# Patient Record
Sex: FEMALE | Race: OTHER
[De-identification: names, ages, dates, MRNs, and addresses within clinical notes are randomized per-mention and may not be internally consistent; named-entity substitution may affect disease eponyms.]

---

## 2019-01-08 ENCOUNTER — HOSPITAL ENCOUNTER (OUTPATIENT)
Dept: HOSPITAL 72 - SUR | Age: 69
Discharge: HOME | End: 2019-01-08
Payer: MEDICARE

## 2019-01-08 VITALS — DIASTOLIC BLOOD PRESSURE: 66 MMHG | SYSTOLIC BLOOD PRESSURE: 130 MMHG

## 2019-01-08 VITALS — SYSTOLIC BLOOD PRESSURE: 141 MMHG | DIASTOLIC BLOOD PRESSURE: 69 MMHG

## 2019-01-08 VITALS — DIASTOLIC BLOOD PRESSURE: 65 MMHG | SYSTOLIC BLOOD PRESSURE: 139 MMHG

## 2019-01-08 VITALS — WEIGHT: 180 LBS | HEIGHT: 66 IN | BODY MASS INDEX: 28.93 KG/M2

## 2019-01-08 VITALS — SYSTOLIC BLOOD PRESSURE: 133 MMHG | DIASTOLIC BLOOD PRESSURE: 72 MMHG

## 2019-01-08 VITALS — DIASTOLIC BLOOD PRESSURE: 76 MMHG | SYSTOLIC BLOOD PRESSURE: 145 MMHG

## 2019-01-08 VITALS — SYSTOLIC BLOOD PRESSURE: 138 MMHG | DIASTOLIC BLOOD PRESSURE: 68 MMHG

## 2019-01-08 VITALS — SYSTOLIC BLOOD PRESSURE: 140 MMHG | DIASTOLIC BLOOD PRESSURE: 69 MMHG

## 2019-01-08 VITALS — SYSTOLIC BLOOD PRESSURE: 129 MMHG | DIASTOLIC BLOOD PRESSURE: 71 MMHG

## 2019-01-08 VITALS — DIASTOLIC BLOOD PRESSURE: 72 MMHG | SYSTOLIC BLOOD PRESSURE: 141 MMHG

## 2019-01-08 VITALS — DIASTOLIC BLOOD PRESSURE: 72 MMHG | SYSTOLIC BLOOD PRESSURE: 139 MMHG

## 2019-01-08 DIAGNOSIS — Z88.0: ICD-10-CM

## 2019-01-08 DIAGNOSIS — Z88.8: ICD-10-CM

## 2019-01-08 DIAGNOSIS — D64.9: ICD-10-CM

## 2019-01-08 DIAGNOSIS — E78.5: ICD-10-CM

## 2019-01-08 DIAGNOSIS — Z88.2: ICD-10-CM

## 2019-01-08 DIAGNOSIS — E11.9: ICD-10-CM

## 2019-01-08 DIAGNOSIS — E66.3: ICD-10-CM

## 2019-01-08 DIAGNOSIS — Z79.4: ICD-10-CM

## 2019-01-08 DIAGNOSIS — I10: ICD-10-CM

## 2019-01-08 DIAGNOSIS — M23.222: Primary | ICD-10-CM

## 2019-01-08 DIAGNOSIS — M60.9: ICD-10-CM

## 2019-01-08 DIAGNOSIS — M23.201: ICD-10-CM

## 2019-01-08 DIAGNOSIS — K21.9: ICD-10-CM

## 2019-01-08 PROCEDURE — 82962 GLUCOSE BLOOD TEST: CPT

## 2019-01-08 PROCEDURE — 94003 VENT MGMT INPAT SUBQ DAY: CPT

## 2019-01-08 PROCEDURE — 29880 ARTHRS KNE SRG MNISECTMY M&L: CPT

## 2019-01-08 PROCEDURE — 94150 VITAL CAPACITY TEST: CPT

## 2019-01-08 NOTE — BRIEF OPERATIVE NOTE
Immediate Post Operative Note


Operative Note


Pre-op Diagnosis:


Left Knee meniscus tear both medial and lateral


Procedure:





medial and lateral meniscectomy


Chondroplasty


synovectomy


arthroscopy


Post-op Diagnosis:


Torn left knee medial and lateral meniscus


synovits


degenerative arthritis knee


Surgeon:  Harshal Rothman


Anesthesiologist:  Dr. Stone


Anesthesia:  general


Specimen:  yes


Complications:  none


Condition:  stable


Fluids:  250 cc


Estimated Blood Loss:  minimal


Drains:  none


Implant(s) used?:  No











Harshal Rothman MD Jan 8, 2019 08:36

## 2019-01-08 NOTE — IMMEDIATE POST-OP EVALUATION
Immediate Post-Op Evalulation


Immediate Post-Op Evalulation


Procedure:  L knee arthroscopy, meniscectomy


Date of Evaluation:  Jan 8, 2019


Time of Evaluation:  08:40


IV Fluids:  700


Blood Products:  none


Estimated Blood Loss:  min


Urinary Output:  none


Blood Pressure Systolic:  132


Blood Pressure Diastolic:  68


Pulse Rate:  76


Respiratory Rate:  22


O2 Sat by Pulse Oximetry:  99


Temperature (Fahrenheit):  97.6


Pain Score (1-10):  1


Nausea:  No


Vomiting:  No


Complications


none


Patient Status:  awake, patent, none


Hydration Status:  adequate











Tyshawn Garcia MD Jan 8, 2019 08:41

## 2019-01-08 NOTE — ANETHESIA PREOPERATIVE EVAL
Anesthesia Pre-op PMH/ROS


General


Date of Evaluation:  Jan 8, 2019


Time of Evaluation:  07:05


Anesthesiologist:  Radha


ASA Score:  ASA 3


Mallampati Score


Class I : Soft palate, uvula, fauces, pillars visible


Class II: Soft palate, uvula, fauces visible


Class III: Soft palate, base of uvula visible


Class IV: Only hard plate visible


Mallampati Classification:  Class II


Surgeon:  Stephan


Diagnosis:  L knee pain


Surgical Procedure:  L knee artroscopy


Anesthesia History:  none


Family History:  no anesthesia problems


Allergies:  


Coded Allergies:  


     PENICILLINS (Verified  Allergy, Severe, 1/7/19)


 swelling,itching


     STATINS-HMG-COA REDUCTASE INHIBITOR (Verified  Allergy, Severe, 1/7/19)


 MYOSITIS-LOST OF MUSCLE ACTIVITY


     SULFA (SULFONAMIDE ANTIBIOTICS) (Verified  Allergy, Severe, 1/7/19)


 FACIAL AND NECK WELLING


Medications:  see eMAR


Patient NPO?:  Yes





Past Medical History


Cardiovascular:  Reports: HTN - stable; 


   Denies: CAD, MI, valve dz, arrhythmia, other


Pulmonary:  Denies: asthma, COPD, ROXANNE, other


Gastrointestinal/Genitourinary:  Reports: GERD - mild; 


   Denies: CRI, ESRD, other


Neurologic/Psychiatric:  Denies: dementia, CVA, depression/anxiety, TIA, other


Endocrine:  Reports: DM - stable on insulin; 


   Denies: hypothyroidism, steroids, other


HEENT:  Denies: cataract (L), cataract (R), glaucoma, Platinum (L), Platinum (R), other


Hematology/Immune:  Reports: anemia - mild; 


   Denies: DVT, bleeding disorder, other


Musculoskeletal/Integumentary:  Reports: OA; 


   Denies: RA, DJD, DDD, edema, other


Other:  other - overweight


PMH Narrative:


as above


PSxH Narrative:


Ovarian cyst removal





Anesthesia Pre-op Phys. Exam


Physician Exam





Last Vital Signs








  Date Time  Temp Pulse Resp B/P (MAP) Pulse Ox O2 Delivery O2 Flow Rate FiO2


 


1/8/19 06:28      Room Air  


 


1/8/19 06:18 97.1 88 18 145/76 100   








Constitutional:  NAD


Neurologic:  CN 2-12 intact


Cardiovascular:  RRR, no M/R/G


Respiratory:  CTA


Gastrointestinal:  S/NT/ND





Airway Exam


Mallampati Score:  Class II


MO:  full


Neck:  stiff


ROM:  limited


Teeth:  intact


Dentures:  no upper, no lower





Anesthesia Pre-op A/P


Labs


see chart


Accucheck


124 at admission





Studies


Pre-op Studies:  EKG - NSR





Risk Assessment & Plan


Assessment:


ASA3


Plan:


GA with LMA


Status Change Before Surgery:  No





Pre-Antibiotics


Drug:  Clindamycin 600mg


Given Within 1 Hr of Incision:  Yes


Time Given:  07:42











Tyshawn Garcia MD Jan 8, 2019 07:52

## 2019-01-08 NOTE — OPERATIVE NOTE - DICTATED
DATE OF OPERATION:  01/08/2019

PREOPERATIVE DIAGNOSES:  Medial meniscus tear, lateral meniscus tear,

degenerative arthritis of left knee.



POSTOPERATIVE DIAGNOSES:  Medial meniscus tear, lateral meniscus tear,

degenerative arthritis, synovitis of left knee.



OPERATIVE PROCEDURE:  Medial meniscectomy, lateral meniscectomy,

synovectomy, arthroscopy, chondroplasty.



SURGEON:  Harshal Rothman M.D.



OPERATIVE FINDINGS:  The patient had a posterior horn of medial meniscus

tear.  The patient had severe lateral meniscus tear.  There was grade 1 to

2 chondromalacia of the medial femoral condyle, grade 3 chondromalacia of

the lateral femoral condyle, grade 4 loss of cartilage to the lateral

tibial plateau.  There was synovitis throughout the knee.



DESCRIPTION OF OPERATIVE PROCEDURE:  The patient was taken to the operating

room under general anesthesia.  Left knee diagnostic arthroscopy was done.

Medial and lateral portals were used with drainage portal through the

superomedial site.  Diagnostic arthroscopy was done.  A subtotal posterior

third medial meniscus tear was carried out removing all damaged tissue.

The cartilage was stable underneath the meniscus tear and no chondroplasty

was done.



There was extensive synovitis throughout the anteromedial and

anterolateral aspect of the knee as well as superomedially and

superolaterally.  Anteromedial, anterolateral, superomedial,

superolateral, and suprapatellar synovectomies were done care being taken

to get hemostasis.



From the lateral side of the knee, extensive synovectomy was carried

out.  The lateral meniscus was shorter and torn severely.  The lateral

meniscus was removed to a stable base.  There was granulation tissue and

bleeding of the lateral meniscus indicating inflammation.  Hemostasis was

obtained using electrocautery.  All debris was removed from the lateral

side of the knee and all torn lateral meniscus tissue was removed.



There was chondromalacia grade 4 and all loose cartilage was removed

from the lateral side of the knee removing frayed cartilage.  Because of

the patient's age, no entry into the bone was done.



The wound was irrigated copiously.  Additional synovectomy was carried

out through the medial gutter and the lateral gutter.  There was loose

debris in the medial gutter that was removed with a shaver.  The

suprapatellar pouch was examined and all debris was removed and all

synovial hypertrophy was removed.  Hemostasis was obtained.



The patellofemoral joint had grade 2 chondromalacia and no procedure was

done to the patella.



The patient tolerated the procedure well and was taken to the recovery

room in good condition.









  ______________________________________________

  Harshal Rothman M.D.





DR:  Esvin

D:  01/08/2019 08:58

T:  01/08/2019 09:32

JOB#:  [id number]

CC:

## 2019-01-08 NOTE — PRE-PROCEDURE NOTE/ATTESTATION
Pre-Procedure Note/Attestation


Complete Prior to Procedure


Planned Procedure:  left





Indications for Procedure


Pre-Operative Diagnosis:


Left Knee meniscus tear both medial and lateral





Attestation


I attest that I discussed the nature of the procedure; its benefits; risks and 

complications; and alternatives (and the risks and benefits of such alternatives

), prior to the procedure, with the patient (or the patient's legal 

representative).





I attest that, if there was a reasonable possibility of needing a blood 

transfusion, the patient (or the patient's legal representative) was given the 

[facility] standardized written summary, pursuant 

to the Gene Estefany Blood Safety Act ([place] Health and Safety Code # 1645, as 

amended).





I attest that I re-evaluated the patient just prior to the surgery and that 

there has been no change in the patient's H&P, except as documented below:











Harshal Rothman MD Jan 8, 2019 07:07

## 2019-01-08 NOTE — 48 HOUR POST ANESTHESIA EVAL
Post Anesthesia Evaluation


Procedure:  L knee arthroscopy, meniscectomy


Date of Evaluation:  Jan 8, 2019


Time of Evaluation:  09:24


Blood Pressure Systolic:  136


0:  72


Pulse Rate:  76


Respiratory Rate:  20


Temperature (Fahrenheit):  97.5


O2 Sat by Pulse Oximetry:  98


Airway:  patent


Nausea:  No


Vomiting:  No


Pain Intensity:  2


Hydration Status:  adequate


Cardiopulmonary Status:


stable


Mental Status/LOC:  patient returned to baseline


Follow-up Care/Observations:


n/a


Post-Anesthesia Complications:


none


Follow-up care needed:  ready to discharge











Tyshawn Garcia MD Jan 8, 2019 09:25